# Patient Record
Sex: MALE
[De-identification: names, ages, dates, MRNs, and addresses within clinical notes are randomized per-mention and may not be internally consistent; named-entity substitution may affect disease eponyms.]

---

## 2019-02-26 ENCOUNTER — HOSPITAL ENCOUNTER (OUTPATIENT)
Dept: HOSPITAL 92 - BICMRI | Age: 39
Discharge: HOME | End: 2019-02-26
Attending: FAMILY MEDICINE
Payer: COMMERCIAL

## 2019-02-26 DIAGNOSIS — M48.8X6: ICD-10-CM

## 2019-02-26 DIAGNOSIS — M48.07: ICD-10-CM

## 2019-02-26 DIAGNOSIS — M51.16: Primary | ICD-10-CM

## 2019-02-26 DIAGNOSIS — M48.061: ICD-10-CM

## 2019-02-26 PROCEDURE — 72148 MRI LUMBAR SPINE W/O DYE: CPT

## 2019-02-26 NOTE — MRI
MRI OF LUMBAR SPINE PERFORMED WITHOUT CONTRAST ENHANCEMENT:

 

History: Back pain that radiates to both hips, worse on the left with numbness in bilateral legs and 
bilateral feet. 

 

FINDINGS: 

Vertebral bodies are normal in height. Minimal disc narrowing is seen at the L5-S1 level. No signific
ant periaortic adenopathy identified. T2 hyperintense lesion involving the left kidney is most likely
 a small cyst. 

 

L1-2: Unremarkable. 

 

L2-3: Unremarkable. 

 

L3-4: Very mild facet hypertrophic changes without canal or foraminal stenosis. 

 

L4-5: There is a central disc protrusion at this level. This causes a moderate degree of canal stenos
is with flattening of the thecal sac. There are also moderate degenerative facet and ligamentous hype
rtrophic changes present. No significant foraminal narrowing is appreciated. 

 

L5-S1: There are degenerative facet changes present at this level. The canal is borderline in size. T
here is some borderline left sided foraminal narrowing. 

 

IMPRESSION: 

1. Mild to moderate stenosis at the L4-5 level caused by a combination of disc bulge with a central d
isc protrusion as well as facet and ligamentous hypertrophic change. 

2. Mild left sided foraminal narrowing at the L5-S1 level.

 

POS: [facility]